# Patient Record
Sex: FEMALE | Race: BLACK OR AFRICAN AMERICAN | NOT HISPANIC OR LATINO | URBAN - METROPOLITAN AREA
[De-identification: names, ages, dates, MRNs, and addresses within clinical notes are randomized per-mention and may not be internally consistent; named-entity substitution may affect disease eponyms.]

---

## 2017-03-01 ENCOUNTER — COMPLETE SKIN EXAM (OUTPATIENT)
Dept: URBAN - METROPOLITAN AREA CLINIC 9 | Facility: CLINIC | Age: 62
Setting detail: DERMATOLOGY
End: 2017-03-01

## 2017-03-01 DIAGNOSIS — C44.329 SQUAMOUS CELL CARCINOMA OF SKIN OF OTHER PARTS OF FACE: ICD-10-CM

## 2017-03-01 PROCEDURE — 99212 OFFICE O/P EST SF 10 MIN: CPT

## 2017-03-14 ENCOUNTER — RX ONLY (RX ONLY)
Age: 62
End: 2017-03-14

## 2017-03-14 RX ORDER — TRIAMCINOLONE ACETONIDE 1 MG/G
1 APPLICATION OINTMENT TOPICAL BID
Qty: 454 | Refills: 2 | Status: DISCONTINUED
Start: 2017-03-14 | End: 2018-01-29

## 2017-07-26 ENCOUNTER — RX ONLY (RX ONLY)
Age: 62
End: 2017-07-26

## 2017-07-26 RX ORDER — TRIAMCINOLONE ACETONIDE 1 MG/G
1 APPLICATION CREAM TOPICAL BID
Qty: 1 | Refills: 0
Start: 2017-07-26

## 2017-08-21 ENCOUNTER — OTHER- (OUTPATIENT)
Dept: URBAN - METROPOLITAN AREA CLINIC 9 | Facility: CLINIC | Age: 62
Setting detail: DERMATOLOGY
End: 2017-08-21

## 2017-08-21 DIAGNOSIS — L57.0 ACTINIC KERATOSIS: ICD-10-CM

## 2017-08-21 PROCEDURE — 99213 OFFICE O/P EST LOW 20 MIN: CPT

## 2017-08-21 PROCEDURE — 96372 THER/PROPH/DIAG INJ SC/IM: CPT

## 2017-08-21 RX ORDER — TRIAMCINOLONE ACETONIDE 1 MG/G
OINTMENT TOPICAL
Qty: 453.6 | Refills: 2 | Status: DISCONTINUED
Start: 2017-08-21 | End: 2018-01-09

## 2018-01-09 ENCOUNTER — RX ONLY (RX ONLY)
Age: 63
End: 2018-01-09

## 2018-01-09 RX ORDER — TRIAMCINOLONE ACETONIDE 1 MG/G
1 APPLICATION OINTMENT TOPICAL BID
Qty: 453.6 | Refills: 2
Start: 2018-01-09

## 2018-01-09 RX ORDER — TRIAMCINOLONE ACETONIDE 1 MG/G
1 APPLICATION CREAM TOPICAL BID
Qty: 453.6 | Refills: 2
Start: 2018-01-09

## 2018-01-29 ENCOUNTER — RX ONLY (RX ONLY)
Age: 63
End: 2018-01-29

## 2018-01-29 RX ORDER — TRIAMCINOLONE ACETONIDE 1 MG/G
1 APPLICATION OINTMENT TOPICAL BID
Qty: 454 | Refills: 2 | Status: DISCONTINUED
Start: 2018-01-29 | End: 2018-12-27

## 2018-07-10 ENCOUNTER — RX ONLY (RX ONLY)
Age: 63
End: 2018-07-10

## 2018-07-10 RX ORDER — TRIAMCINOLONE ACETONIDE 1 MG/G
1 APPLICATION OINTMENT TOPICAL BID
Qty: 80 | Refills: 0 | Status: DISCONTINUED
Start: 2018-07-10 | End: 2018-08-31

## 2018-08-31 ENCOUNTER — RX ONLY (RX ONLY)
Age: 63
End: 2018-08-31

## 2018-08-31 RX ORDER — TRIAMCINOLONE ACETONIDE 1 MG/G
1 APPLICATION OINTMENT TOPICAL BID
Qty: 80 | Refills: 0 | Status: DISCONTINUED
Start: 2018-08-31 | End: 2018-11-01

## 2018-11-01 ENCOUNTER — OTHER- (OUTPATIENT)
Dept: URBAN - METROPOLITAN AREA CLINIC 9 | Facility: CLINIC | Age: 63
Setting detail: DERMATOLOGY
End: 2018-11-01

## 2018-11-01 DIAGNOSIS — D22.39 MELANOCYTIC NEVI OF OTHER PARTS OF FACE: ICD-10-CM

## 2018-11-01 DIAGNOSIS — L82.1 OTHER SEBORRHEIC KERATOSIS: ICD-10-CM

## 2018-11-01 DIAGNOSIS — L23.7 ALLERGIC CONTACT DERMATITIS DUE TO PLANTS, EXCEPT FOOD: ICD-10-CM

## 2018-11-01 PROCEDURE — 99213 OFFICE O/P EST LOW 20 MIN: CPT

## 2018-11-01 RX ORDER — TRIAMCINOLONE ACETONIDE 1 MG/G
1 APPLICATION OINTMENT TOPICAL BID
Qty: 80 | Refills: 0 | Status: DISCONTINUED
Start: 2018-11-01 | End: 2018-11-27

## 2018-11-27 ENCOUNTER — RX ONLY (RX ONLY)
Age: 63
End: 2018-11-27

## 2018-11-27 RX ORDER — TRIAMCINOLONE ACETONIDE 1 MG/G
1 APPLICATION OINTMENT TOPICAL BID
Qty: 80 | Refills: 0
Start: 2018-11-27

## 2018-12-27 ENCOUNTER — RX ONLY (RX ONLY)
Age: 63
End: 2018-12-27

## 2018-12-27 RX ORDER — TRIAMCINOLONE ACETONIDE 1 MG/G
1 APPLICATION OINTMENT TOPICAL BID
Qty: 454 | Refills: 2 | Status: DISCONTINUED
Start: 2018-12-27 | End: 2019-05-22

## 2019-05-22 ENCOUNTER — RX ONLY (RX ONLY)
Age: 64
End: 2019-05-22

## 2019-05-22 RX ORDER — TRIAMCINOLONE ACETONIDE 1 MG/G
1 APPLICATION OINTMENT TOPICAL BID
Qty: 454 | Refills: 2 | Status: DISCONTINUED
Start: 2019-05-22 | End: 2019-10-04

## 2019-10-04 ENCOUNTER — RX ONLY (RX ONLY)
Age: 64
End: 2019-10-04

## 2019-10-04 RX ORDER — TRIAMCINOLONE ACETONIDE 1 MG/G
1 APPLICATION OINTMENT TOPICAL BID
Qty: 454 | Refills: 2 | Status: DISCONTINUED
Start: 2019-10-04 | End: 2020-02-19

## 2020-02-19 ENCOUNTER — OTHER- (OUTPATIENT)
Dept: URBAN - METROPOLITAN AREA CLINIC 9 | Facility: CLINIC | Age: 65
Setting detail: DERMATOLOGY
End: 2020-02-19

## 2020-02-19 ENCOUNTER — RX ONLY (RX ONLY)
Age: 65
End: 2020-02-19

## 2020-02-19 DIAGNOSIS — L57.0 ACTINIC KERATOSIS: ICD-10-CM

## 2020-02-19 PROCEDURE — 99213 OFFICE O/P EST LOW 20 MIN: CPT

## 2020-02-19 RX ORDER — TRIAMCINOLONE ACETONIDE 1 MG/G
1 APPLICATION OINTMENT TOPICAL BID
Qty: 454 | Refills: 2 | Status: DISCONTINUED
Start: 2020-02-19 | End: 2020-08-17

## 2020-02-19 RX ORDER — PIMECROLIMUS 10 MG/G
1 APPLICATION CREAM TOPICAL DAILY
Qty: 60 | Refills: 1
Start: 2020-02-19

## 2020-08-17 ENCOUNTER — RX ONLY (RX ONLY)
Age: 65
End: 2020-08-17

## 2020-08-17 RX ORDER — TRIAMCINOLONE ACETONIDE 1 MG/G
1 APPLICATION OINTMENT TOPICAL BID
Qty: 454 | Refills: 2
Start: 2020-08-17

## 2020-11-25 ENCOUNTER — RX ONLY (RX ONLY)
Age: 65
End: 2020-11-25

## 2020-11-25 RX ORDER — TRIAMCINOLONE ACETONIDE 1 MG/G
1 A SMALL AMOUNT OINTMENT TOPICAL TWICE A DAY
Qty: 454 | Refills: 0
Start: 2020-11-25

## 2021-06-08 ENCOUNTER — COMPLETE SKIN EXAM (OUTPATIENT)
Dept: URBAN - METROPOLITAN AREA CLINIC 9 | Facility: CLINIC | Age: 66
Setting detail: DERMATOLOGY
End: 2021-06-08

## 2021-06-08 DIAGNOSIS — L57.0 ACTINIC KERATOSIS: ICD-10-CM

## 2021-06-08 PROCEDURE — 99213 OFFICE O/P EST LOW 20 MIN: CPT

## 2021-06-08 RX ORDER — TRIAMCINOLONE ACETONIDE 1 MG/G
1 A SMALL AMOUNT OINTMENT TOPICAL ONCE A DAY
Qty: 454 | Refills: 1
Start: 2021-06-08

## 2021-11-04 ENCOUNTER — RX ONLY (RX ONLY)
Age: 66
End: 2021-11-04

## 2021-11-04 RX ORDER — TRIAMCINOLONE ACETONIDE 1 MG/G
OINTMENT TOPICAL
Qty: 30 | Refills: 1
Start: 2021-11-04

## 2021-12-07 ENCOUNTER — RX ONLY (RX ONLY)
Age: 66
End: 2021-12-07

## 2021-12-07 RX ORDER — TRIAMCINOLONE ACETONIDE 1 MG/G
OINTMENT TOPICAL
Qty: 454 | Refills: 1
Start: 2021-12-07

## 2022-08-04 ENCOUNTER — OTHER- (OUTPATIENT)
Dept: URBAN - METROPOLITAN AREA CLINIC 9 | Facility: CLINIC | Age: 67
Setting detail: DERMATOLOGY
End: 2022-08-04

## 2022-08-04 DIAGNOSIS — D04.5 CARCINOMA IN SITU OF SKIN OF TRUNK: ICD-10-CM

## 2022-08-04 PROCEDURE — 99213 OFFICE O/P EST LOW 20 MIN: CPT

## 2022-08-04 RX ORDER — TRIAMCINOLONE ACETONIDE 1 MG/G
OINTMENT TOPICAL
Qty: 454 | Refills: 1
Start: 2022-08-04

## 2022-08-17 ENCOUNTER — RX ONLY (RX ONLY)
Age: 67
End: 2022-08-17

## 2022-08-17 RX ORDER — TRIAMCINOLONE ACETONIDE 1 MG/G
1 A SMALL AMOUNT OINTMENT TOPICAL TWICE A DAY
Qty: 454 | Refills: 0
Start: 2022-08-17

## 2022-08-24 ENCOUNTER — RX ONLY (RX ONLY)
Age: 67
End: 2022-08-24

## 2022-08-24 RX ORDER — TRIAMCINOLONE ACETONIDE 1 MG/G
1 A SMALL AMOUNT OINTMENT TOPICAL TWICE A DAY
Qty: 454 | Refills: 2
Start: 2022-08-24

## 2022-10-19 ENCOUNTER — RX ONLY (RX ONLY)
Age: 67
End: 2022-10-19

## 2022-10-19 RX ORDER — TRIAMCINOLONE ACETONIDE 1 MG/G
OINTMENT TOPICAL
Qty: 454 | Refills: 0 | Status: ERX | COMMUNITY
Start: 2022-10-19

## 2023-02-09 ENCOUNTER — RX ONLY (RX ONLY)
Age: 68
End: 2023-02-09

## 2023-02-09 RX ORDER — TRIAMCINOLONE ACETONIDE 1 MG/G
OINTMENT TOPICAL
Qty: 454 | Refills: 0 | Status: ERX

## 2023-02-15 ENCOUNTER — RX ONLY (RX ONLY)
Age: 68
End: 2023-02-15

## 2023-02-15 RX ORDER — TRIAMCINOLONE ACETONIDE 1 MG/G
OINTMENT TOPICAL
Qty: 454 | Refills: 1 | Status: ERX

## 2023-08-28 ENCOUNTER — RX ONLY (RX ONLY)
Age: 68
End: 2023-08-28

## 2023-08-28 RX ORDER — TRIAMCINOLONE ACETONIDE 1 MG/G
OINTMENT TOPICAL
Qty: 30 | Refills: 0 | Status: ERX

## 2024-02-19 ENCOUNTER — APPOINTMENT (OUTPATIENT)
Dept: URBAN - METROPOLITAN AREA CLINIC 214 | Age: 69
Setting detail: DERMATOLOGY
End: 2024-02-19

## 2024-02-19 DIAGNOSIS — L30.9 DERMATITIS, UNSPECIFIED: ICD-10-CM

## 2024-02-19 DIAGNOSIS — L85.3 XEROSIS CUTIS: ICD-10-CM

## 2024-02-19 PROCEDURE — 99213 OFFICE O/P EST LOW 20 MIN: CPT

## 2024-02-19 PROCEDURE — OTHER COUNSELING: OTHER

## 2024-02-19 PROCEDURE — OTHER PRESCRIPTION MEDICATION MANAGEMENT: OTHER

## 2024-02-19 PROCEDURE — OTHER MIPS QUALITY: OTHER

## 2024-02-19 PROCEDURE — OTHER PRESCRIPTION: OTHER

## 2024-02-19 RX ORDER — FLUTICASONE PROPIONATE 0.05 MG/G
OINTMENT TOPICAL
Qty: 60 | Refills: 3 | Status: ERX | COMMUNITY
Start: 2024-02-19

## 2024-02-19 ASSESSMENT — LOCATION SIMPLE DESCRIPTION DERM
LOCATION SIMPLE: RIGHT FOREARM
LOCATION SIMPLE: RIGHT PRETIBIAL REGION
LOCATION SIMPLE: LEFT PRETIBIAL REGION
LOCATION SIMPLE: LEFT FOREARM

## 2024-02-19 ASSESSMENT — BSA RASH: BSA RASH: 5

## 2024-02-19 ASSESSMENT — LOCATION DETAILED DESCRIPTION DERM
LOCATION DETAILED: LEFT PROXIMAL DORSAL FOREARM
LOCATION DETAILED: LEFT DISTAL PRETIBIAL REGION
LOCATION DETAILED: RIGHT PROXIMAL PRETIBIAL REGION
LOCATION DETAILED: RIGHT PROXIMAL DORSAL FOREARM
LOCATION DETAILED: RIGHT PROXIMAL RADIAL DORSAL FOREARM

## 2024-02-19 ASSESSMENT — SEVERITY ASSESSMENT: SEVERITY: ALMOST CLEAR

## 2024-02-19 ASSESSMENT — LOCATION ZONE DERM
LOCATION ZONE: ARM
LOCATION ZONE: LEG

## 2024-02-19 ASSESSMENT — ITCH NUMERIC RATING SCALE: HOW SEVERE IS YOUR ITCHING?: 2

## 2024-02-19 NOTE — PROCEDURE: PRESCRIPTION MEDICATION MANAGEMENT
Continue Regimen: fluticasone propionate 0.005 % topical ointment \\nQuantity: 60.0 g  Days Supply: 30\\nSig: Apply to the affected itchy areas on the body twice daily as needed
Detail Level: Zone
Render In Strict Bullet Format?: No

## 2024-02-19 NOTE — HPI: RASH
What Type Of Note Output Would You Prefer (Optional)?: Bullet Format
Is The Patient Presenting As Previously Scheduled?: Yes
How Severe Is Your Rash?: mild
Is This A New Presentation, Or A Follow-Up?: Rash
Additional History: Pt needs refill of triamcinolone

## 2024-04-15 ENCOUNTER — RX ONLY (RX ONLY)
Age: 69
End: 2024-04-15

## 2024-04-15 RX ORDER — TRIAMCINOLONE ACETONIDE 1 MG/G
OINTMENT TOPICAL
Qty: 30 | Refills: 0 | Status: ERX

## 2024-04-30 ENCOUNTER — APPOINTMENT (OUTPATIENT)
Dept: URBAN - METROPOLITAN AREA CLINIC 214 | Age: 69
Setting detail: DERMATOLOGY
End: 2024-05-01

## 2024-04-30 DIAGNOSIS — L20.89 OTHER ATOPIC DERMATITIS: ICD-10-CM

## 2024-04-30 DIAGNOSIS — L85.3 XEROSIS CUTIS: ICD-10-CM

## 2024-04-30 PROCEDURE — 99214 OFFICE O/P EST MOD 30 MIN: CPT

## 2024-04-30 PROCEDURE — OTHER COUNSELING: OTHER

## 2024-04-30 PROCEDURE — OTHER MIPS QUALITY: OTHER

## 2024-04-30 PROCEDURE — OTHER PRESCRIPTION: OTHER

## 2024-04-30 PROCEDURE — OTHER PRESCRIPTION MEDICATION MANAGEMENT: OTHER

## 2024-04-30 RX ORDER — TRIAMCINOLONE ACETONIDE 1 MG/G
OINTMENT TOPICAL
Qty: 30 | Refills: 3 | Status: ERX

## 2024-04-30 RX ORDER — FLUTICASONE PROPIONATE 0.05 MG/G
OINTMENT TOPICAL
Qty: 60 | Refills: 3 | Status: ERX

## 2024-04-30 ASSESSMENT — LOCATION ZONE DERM
LOCATION ZONE: ARM
LOCATION ZONE: HAND

## 2024-04-30 ASSESSMENT — LOCATION DETAILED DESCRIPTION DERM
LOCATION DETAILED: LEFT DISTAL DORSAL FOREARM
LOCATION DETAILED: RIGHT PROXIMAL DORSAL FOREARM
LOCATION DETAILED: LEFT RADIAL DORSAL HAND
LOCATION DETAILED: RIGHT RADIAL DORSAL HAND
LOCATION DETAILED: LEFT PROXIMAL DORSAL FOREARM

## 2024-04-30 ASSESSMENT — LOCATION SIMPLE DESCRIPTION DERM
LOCATION SIMPLE: LEFT FOREARM
LOCATION SIMPLE: RIGHT HAND
LOCATION SIMPLE: RIGHT FOREARM
LOCATION SIMPLE: LEFT HAND

## 2024-04-30 NOTE — PROCEDURE: PRESCRIPTION MEDICATION MANAGEMENT
Plan: Refills sent per pt’s request
Detail Level: Zone
Render In Strict Bullet Format?: No
Continue Regimen: Triamcinolone ointment\\nFluticasone ointment
Continue Regimen: triamcinolone acetonide 0.1 % topical ointment \\nQuantity: 30.0 g  Days Supply: 30\\nSig: Apply to the affected areas on the body twice daily\\n\\nfluticasone propionate 0.005 % topical ointment \\nQuantity: 60.0 g  Days Supply: 30\\nSig: Apply to affected itchy areas twice daily PRN
Plan: Will follow up in 1 year to recheck condition

## 2024-06-26 RX ORDER — TRIAMCINOLONE ACETONIDE 1 MG/G
OINTMENT TOPICAL
Qty: 30 | Refills: 3 | Status: ERX